# Patient Record
Sex: MALE | Race: WHITE | Employment: OTHER | URBAN - METROPOLITAN AREA
[De-identification: names, ages, dates, MRNs, and addresses within clinical notes are randomized per-mention and may not be internally consistent; named-entity substitution may affect disease eponyms.]

---

## 2024-02-29 ENCOUNTER — OFFICE VISIT (OUTPATIENT)
Dept: NEUROLOGY | Facility: HOSPITAL | Age: 74
End: 2024-02-29
Payer: MEDICARE

## 2024-02-29 VITALS
SYSTOLIC BLOOD PRESSURE: 107 MMHG | BODY MASS INDEX: 23.68 KG/M2 | HEART RATE: 58 BPM | DIASTOLIC BLOOD PRESSURE: 68 MMHG | HEIGHT: 70 IN

## 2024-02-29 DIAGNOSIS — G31.83 SEVERE LEWY BODY DEMENTIA WITH PSYCHOTIC DISTURBANCE (MULTI): Primary | ICD-10-CM

## 2024-02-29 DIAGNOSIS — F02.C2 SEVERE LEWY BODY DEMENTIA WITH PSYCHOTIC DISTURBANCE (MULTI): Primary | ICD-10-CM

## 2024-02-29 PROCEDURE — 99214 OFFICE O/P EST MOD 30 MIN: CPT | Performed by: PSYCHIATRY & NEUROLOGY

## 2024-02-29 PROCEDURE — 1126F AMNT PAIN NOTED NONE PRSNT: CPT | Performed by: PSYCHIATRY & NEUROLOGY

## 2024-02-29 RX ORDER — DOCUSATE SODIUM 100 MG/1
100 CAPSULE, LIQUID FILLED ORAL 2 TIMES DAILY
COMMUNITY

## 2024-02-29 RX ORDER — ATORVASTATIN CALCIUM 10 MG/1
10 TABLET, FILM COATED ORAL DAILY
COMMUNITY

## 2024-02-29 RX ORDER — ESCITALOPRAM OXALATE 10 MG/1
10 TABLET ORAL DAILY
COMMUNITY

## 2024-02-29 RX ORDER — QUETIAPINE FUMARATE 100 MG/1
100 TABLET, FILM COATED ORAL NIGHTLY
COMMUNITY

## 2024-02-29 RX ORDER — CARBIDOPA AND LEVODOPA 25; 100 MG/1; MG/1
1 TABLET, ORALLY DISINTEGRATING ORAL 3 TIMES DAILY
COMMUNITY

## 2024-02-29 RX ORDER — HYDROXYZINE PAMOATE 25 MG/1
25 CAPSULE ORAL 3 TIMES DAILY PRN
COMMUNITY

## 2024-02-29 RX ORDER — QUETIAPINE FUMARATE 25 MG/1
25 TABLET, FILM COATED ORAL NIGHTLY
COMMUNITY

## 2024-02-29 RX ORDER — MELATONIN 10 MG
CAPSULE ORAL
COMMUNITY

## 2024-02-29 RX ORDER — OMEPRAZOLE 20 MG/1
20 TABLET, DELAYED RELEASE ORAL
COMMUNITY

## 2024-02-29 RX ORDER — ADHESIVE BANDAGE
BANDAGE TOPICAL DAILY PRN
COMMUNITY

## 2024-02-29 RX ORDER — LOPERAMIDE HYDROCHLORIDE 2 MG/1
2 CAPSULE ORAL 4 TIMES DAILY PRN
COMMUNITY

## 2024-02-29 RX ORDER — LOSARTAN POTASSIUM 25 MG/1
25 TABLET ORAL DAILY
COMMUNITY

## 2024-02-29 NOTE — PROGRESS NOTES
Subjective     Venu Watkins is a right handed  74 y.o. year old male who presents with Parkinson's Disease (fu). Patient is accompanied by: Other ex wife  Visit type: follow up visit     Relevant medications at [facility] added here:  Seroquel 25 mg at bedtime prm  Seroquel 100 mg qhs  Carbidopa/levodopa 25/100 - 2 tabs in the evening.    Lexapro 10 mg   Melatonin 10 mg at bedtime    Last visit was in 2022, has had many medication changes and adjustments since then. Here w ex-wife, she does not recall all meds he has tried etc, but has a log at home that she will forward to me.     Has had episodes of aggression, needing admission to the hospital on several occasions. Ex wife feels that medications changes are what causes most of the issues. Ex wife notices that he is declining, most so in cognition. He has falls occasionally as well.       Motor symptoms:   Tremor:  Location- none                 Rest/postural/action- none  Stiffness/rigidity: moderate  Slowness:   moderate  Trouble walking:  shuffles  Freezing of gait:  yes does happen   Balance problems:  yes  Falls:  yes  Changes in speech:  soft speech  Swallowing difficulties:  denies  Handwriting: does not write anymore  Activities of daily living (buttoning clothes, bathing, cutting food, etc):  needs help w all ADLs.     Review of Non-Motor symptoms:  Cognition:  Memory- impaired, has a difficulty putting sentences together. Has a lucid moment every now and then. He sleeps constantly. Often sentences do not make sense when he is talking.          Hallucinations-  has VH - they are scary, he was seeing snakes on the ground. He is always playing with his hands, hands are empty, but wants to give his ex wife something,  sometimes may see an animal on occasion,            Mood:        Depression-  he is on meds, no clear depression, has significant anxiety  Sleep disturbances including REM behavior disorder: sleeps a lot, does have some RBD still, has fallen  "out of bed, does not sleep through night  Gastrointestinal complaints/Constipation:  not incontinent, but needs assistance  Urinary retention or frequency:  wears depends, has incontinence after prostate surgery in past as well.   Positional lightheadedness and/or syncope:  denies  Excessive saliva/drooling:  mild      Medication History:  Donepezil - tried in the past, unclear why stopped.  Has had side effects w multiple meds, but they cannot recall all of them.     Prior history:  Developed R hand rest tremor in 2018/19, was diagnosed w PD in 2019. Was started on C/L shortly thereafter and it was helpful. Wife had not noticed memory issues prior to diagnosis, but shortly thereafter had difficulty completing sentences. Looking back, in 2015 when he was living w his ex-wife, had REM sleep behavior disorder, would scream and fall out of bed, and also at that time he may have been shuffling his feet as well. Had surgery for prostate cancer in 2016 - he was extremely anxious and after the surgery he was confused/delirious.   He needed help managing finances by Dec 2021, (declined that fall).   He was able to drive up until then.   January 1st he called at 5 am and said he had fallen out of bed, and \"sliced ear off\".   2 days later he was delirious, would say sentences and words that did not make sense. He could not get dressed or do ADL's, Was admitted to [place]. He was admitted to rehab, was tied down in wheelchair and had a tent on at night.       There is no problem list on file for this patient.     Past Medical History:   Diagnosis Date    Other conditions influencing health status 06/25/2014    Colonoscopy (Fiberoptic)      Past Surgical History:   Procedure Laterality Date    OTHER SURGICAL HISTORY  01/09/2020    Hand surgery    OTHER SURGICAL HISTORY  01/09/2020    Prostatectomy      Social History     Socioeconomic History    Marital status:      Spouse name: Not on file    Number of children: Not " on file    Years of education: Not on file    Highest education level: Not on file   Occupational History    Not on file   Tobacco Use    Smoking status: Never    Smokeless tobacco: Not on file   Substance and Sexual Activity    Alcohol use: Not Currently     Alcohol/week: 1.0 standard drink of alcohol     Types: 1 Cans of beer per week    Drug use: Never    Sexual activity: Not on file   Other Topics Concern    Not on file   Social History Narrative    Not on file     Social Determinants of Health     Financial Resource Strain: Not on file   Food Insecurity: Not on file   Transportation Needs: Not on file   Physical Activity: Not on file   Stress: Not on file   Social Connections: Not on file   Intimate Partner Violence: Not on file   Housing Stability: Not on file      No family history on file.              Review of Systems  All other system have been reviewed and are negative for complaint.  Objective   Vitals:    02/29/24 1137   BP: 107/68   Pulse: 58      Neurological Exam  Alert., he attend to examiner. Cannot follow simple verbal commands, answers questions w                   Hemoglobin A1C   Date Value Ref Range Status   10/30/2023 5.3 <5.7 % Final     Comment:     Normal less than 5.7%  Prediabetes 5.7% to 6.4%  Diabetes 6.5% or higher      --HgbA1C levels may not be accurate in patients who have renal disease, received recent blood transfusions, are anemic, or who have dyshemoglobinemia.     Estimated Average Glucose   Date Value Ref Range Status   10/30/2023 105 mg/dL Final     TSH   Date Value Ref Range Status   08/28/2022 1.60 0.44 - 3.98 mIU/L Final     Comment:      TSH testing is performed using different testing    methodology at [facility] than at other    [facility] hospitals. Direct result comparisons should    only be made within the same method.       CT head 10/2023:  FINDINGS: The ventricles are within normal limits in respect to their size and configuration. There is no evidence  of mass or mass-effect. There are no abnormal intra- or extra-axial fluid collections. No hemorrhage is identified. There is no CT evidence of an acute infarct. Bone windows demonstrate no evidence of fracture and the visualized paranasal sinuses and mastoid air cells are clear.     Assessment/Plan       Venu Watkins is a 74 y.o. year old male here for follow up of probable DLB. He has not been seen since 2022, is now living at a memory unit.  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   Nevertheless at this time it does not . Thankfully his parkinsonism is on the milder side, and need to constantly weigh risks benefit ratio given likely worsening of VH and agitation with higher levodopa doses. Will however change the timing of his medications.   Plan:    Plan:  Continue quetiapine  2. Try taking carbidopa/levodopa (25/100) one tab twice a day one tab in am as well as one tab at dinner. STOP taking it in evening.   3. RTC in 6-12 months  4. Other medications that can be useful for the hallucinations include Nuplazid/Pimavanserin.  5. Medications for anxiety such as anti-depressants can be helpful. Ex-wife will look into what medications he has tried in the past.   6. RTC in 6-9 months.   7. Given Probable DLB - extra care to avoid dopamine blocking agents, which are likely to cause acute dystonic reactions.   8. Avoid all other parkinson's meds such as DA, anticholinergics and amantadine that may worsen his cognitive status.        CODING and DOCUMENTATION DETERMINATION  For the Evaluation and Management of this patient, the level of Medical Decision Making for this visit was determined based on the following:    The level of COMPLEXITY AND NUMBER OF PROBLEMS ADDRESSED was [MODERATE,  as determined by:     MODERATE:    one chronic illnesses with exacerbation, progression or side effect of Rx.    The  AMOUNT/COMPLEXITY OF DATA TO REVIEW (reviewed, ordered or call for) was  LIMITED] as determined by:      LIMITED:  my review of prior external notes from a unique source.    The level of RISK OF COMPLICATIONS was [MODERATE, ] as determined by:    MODERATE:  prescription drug management.        Thus, the level of medical decision making (based on the lower of the two highest elements) was determined to be [MODERATE, ]. Therefore the appropriate E/M code for this encounter is /92397,

## 2024-02-29 NOTE — PATIENT INSTRUCTIONS
It was nice to see you both today.     Plan:  1, continue quetiapine  2. Try taking carbidopa/levodopa (25/100) one tab twice a day one tab in am as well as one tab at dinner. STOP taking it in evening.   3. RTC in 6-12 months  4. Other medications that can be useful for the hallucinations include Nuplazid/Pimavanserin.  5. Medications for anxiety such as anti-depressants can be helpful.

## 2024-02-29 NOTE — LETTER
March 3, 2024     Tanesha Banks, APRN-CNP  [address] [place] [facility]  [place] [facility] [id number]    Patient: Venu Watkins   YOB: 1950   Date of Visit: 2/29/2024       Dear Dr. Tanesha Banks, APRN-CNP:    Thank you for referring Venu Watkins to me for evaluation. Below are my notes for this consultation.  If you have questions, please do not hesitate to call me. I look forward to following your patient along with you.       Sincerely,     Mercedes Rehman MD      CC: No Recipients  ______________________________________________________________________________________    Subjective     Venu Watkins is a right handed  74 y.o. year old male who presents with Parkinson's Disease (fu). Patient is accompanied by: Other ex wife  Visit type: follow up visit     Relevant medications at [facility] added here:  Seroquel 25 mg at bedtime prm  Seroquel 100 mg qhs  Carbidopa/levodopa 25/100 - 2 tabs in the evening.    Lexapro 10 mg   Melatonin 10 mg at bedtime    Last visit was in 2022, has had many medication changes and adjustments since then. Here w ex-wife, she does not recall all meds he has tried etc, but has a log at home that she will forward to me.     Has had episodes of aggression, needing admission to the hospital on several occasions. Ex wife feels that medications changes are what causes most of the issues. Ex wife notices that he is declining, most so in cognition. He has falls occasionally as well.       Motor symptoms:   Tremor:  Location- none                 Rest/postural/action- none  Stiffness/rigidity: moderate  Slowness:   moderate  Trouble walking:  shuffles  Freezing of gait:  yes does happen   Balance problems:  yes  Falls:  yes  Changes in speech:  soft speech  Swallowing difficulties:  denies  Handwriting: does not write anymore  Activities of daily living (buttoning clothes, bathing, cutting food, etc):  needs help w all ADLs.     Review of  "Non-Motor symptoms:  Cognition:  Memory- impaired, has a difficulty putting sentences together. Has a lucid moment every now and then. He sleeps constantly. Often sentences do not make sense when he is talking.          Hallucinations-  has VH - they are scary, he was seeing snakes on the ground. He is always playing with his hands, hands are empty, but wants to give his ex wife something,  sometimes may see an animal on occasion,            Mood:        Depression-  he is on meds, no clear depression, has significant anxiety  Sleep disturbances including REM behavior disorder: sleeps a lot, does have some RBD still, has fallen out of bed, does not sleep through night  Gastrointestinal complaints/Constipation:  not incontinent, but needs assistance  Urinary retention or frequency:  wears depends, has incontinence after prostate surgery in past as well.   Positional lightheadedness and/or syncope:  denies  Excessive saliva/drooling:  mild      Medication History:  Donepezil - tried in the past, unclear why stopped.  Has had side effects w multiple meds, but they cannot recall all of them.     Prior history:  Developed R hand rest tremor in 2018/19, was diagnosed w PD in 2019. Was started on C/L shortly thereafter and it was helpful. Wife had not noticed memory issues prior to diagnosis, but shortly thereafter had difficulty completing sentences. Looking back, in 2015 when he was living w his ex-wife, had REM sleep behavior disorder, would scream and fall out of bed, and also at that time he may have been shuffling his feet as well. Had surgery for prostate cancer in 2016 - he was extremely anxious and after the surgery he was confused/delirious.   He needed help managing finances by Dec 2021, (declined that fall).   He was able to drive up until then.   January 1st he called at 5 am and said he had fallen out of bed, and \"sliced ear off\".   2 days later he was delirious, would say sentences and words that did not " make sense. He could not get dressed or do ADL's, Was admitted to [facility]. He was admitted to rehab, was tied down in wheelchair and had a tent on at night.       There is no problem list on file for this patient.     Past Medical History:   Diagnosis Date   • Other conditions influencing health status 06/25/2014    Colonoscopy (Fiberoptic)      Past Surgical History:   Procedure Laterality Date   • OTHER SURGICAL HISTORY  01/09/2020    Hand surgery   • OTHER SURGICAL HISTORY  01/09/2020    Prostatectomy      Social History     Socioeconomic History   • Marital status:      Spouse name: Not on file   • Number of children: Not on file   • Years of education: Not on file   • Highest education level: Not on file   Occupational History   • Not on file   Tobacco Use   • Smoking status: Never   • Smokeless tobacco: Not on file   Substance and Sexual Activity   • Alcohol use: Not Currently     Alcohol/week: 1.0 standard drink of alcohol     Types: 1 Cans of beer per week   • Drug use: Never   • Sexual activity: Not on file   Other Topics Concern   • Not on file   Social History Narrative   • Not on file     Social Determinants of Health     Financial Resource Strain: Not on file   Food Insecurity: Not on file   Transportation Needs: Not on file   Physical Activity: Not on file   Stress: Not on file   Social Connections: Not on file   Intimate Partner Violence: Not on file   Housing Stability: Not on file      No family history on file.              Review of Systems  All other system have been reviewed and are negative for complaint.  Objective   Vitals:    02/29/24 1137   BP: 107/68   Pulse: 58      Neurological Exam  Alert., he attend to examiner. Cannot follow simple verbal commands, answers questions w                   Hemoglobin A1C   Date Value Ref Range Status   10/30/2023 5.3 <5.7 % Final     Comment:     Normal less than 5.7%  Prediabetes 5.7% to 6.4%  Diabetes 6.5% or higher      --HgbA1C levels may not  be accurate in patients who have renal disease, received recent blood transfusions, are anemic, or who have dyshemoglobinemia.     Estimated Average Glucose   Date Value Ref Range Status   10/30/2023 105 mg/dL Final     TSH   Date Value Ref Range Status   08/28/2022 1.60 0.44 - 3.98 mIU/L Final     Comment:      TSH testing is performed using different testing    methodology at [facility] than at other    [facility] hospitals. Direct result comparisons should    only be made within the same method.       CT head 10/2023:  FINDINGS: The ventricles are within normal limits in respect to their size and configuration. There is no evidence of mass or mass-effect. There are no abnormal intra- or extra-axial fluid collections. No hemorrhage is identified. There is no CT evidence of an acute infarct. Bone windows demonstrate no evidence of fracture and the visualized paranasal sinuses and mastoid air cells are clear.     Assessment/Plan       Venu Watkins is a 74 y.o. year old male here for follow up of probable DLB. He has not been seen since 2022, is now living at a memory unit.  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   Nevertheless at this time it does not . Thankfully his parkinsonism is on the milder side, and need to constantly weigh risks benefit ratio given likely worsening of VH and agitation with higher levodopa doses. Will however change the timing of his medications.   Plan:    Plan:  Continue quetiapine  2. Try taking carbidopa/levodopa (25/100) one tab twice a day one tab in am as well as one tab at dinner. STOP taking it in evening.   3. RTC in 6-12 months  4. Other medications that can be useful for the hallucinations include Nuplazid/Pimavanserin.  5. Medications for anxiety such as anti-depressants can be helpful. Ex-wife will look into what medications he has tried in the past.    6. RTC in 6-9 months.   7. Given Probable DLB - extra care to avoid dopamine blocking agents, which are likely to cause acute dystonic reactions.   8. Avoid all other parkinson's meds such as DA, anticholinergics and amantadine that may worsen his cognitive status.        CODING and DOCUMENTATION DETERMINATION  For the Evaluation and Management of this patient, the level of Medical Decision Making for this visit was determined based on the following:    The level of COMPLEXITY AND NUMBER OF PROBLEMS ADDRESSED was [MODERATE,  as determined by:     MODERATE:    one chronic illnesses with exacerbation, progression or side effect of Rx.    The AMOUNT/COMPLEXITY OF DATA TO REVIEW (reviewed, ordered or call for) was  LIMITED] as determined by:      LIMITED:  my review of prior external notes from a unique source.    The level of RISK OF COMPLICATIONS was [MODERATE, ] as determined by:    MODERATE:  prescription drug management.        Thus, the level of medical decision making (based on the lower of the two highest elements) was determined to be [MODERATE, ]. Therefore the appropriate E/M code for this encounter is /58069,

## 2024-12-02 ENCOUNTER — APPOINTMENT (OUTPATIENT)
Dept: NEUROLOGY | Facility: CLINIC | Age: 74
End: 2024-12-02
Payer: MEDICARE